# Patient Record
Sex: FEMALE | Race: WHITE | URBAN - METROPOLITAN AREA
[De-identification: names, ages, dates, MRNs, and addresses within clinical notes are randomized per-mention and may not be internally consistent; named-entity substitution may affect disease eponyms.]

---

## 2020-10-07 ENCOUNTER — TELEPHONE (OUTPATIENT)
Dept: ORTHOPEDIC SURGERY | Age: 60
End: 2020-10-07

## 2020-10-07 NOTE — TELEPHONE ENCOUNTER
Auth: # [id number]    Date: 10/26/20  Type of SX:  OUTPATIENT  Location: [facility] [address] [facility]  CPT: [address]  [address] area: [place]  Insurance: [employer]IS[employer]  SX VALID 10/26/20 TO 04/24/21

## 2020-10-19 ENCOUNTER — TELEPHONE (OUTPATIENT)
Dept: ORTHOPEDIC SURGERY | Age: 60
End: 2020-10-19

## 2020-10-27 ENCOUNTER — TELEPHONE (OUTPATIENT)
Dept: ORTHOPEDIC SURGERY | Age: 60
End: 2020-10-27

## 2020-10-27 NOTE — TELEPHONE ENCOUNTER
DR. YU POST OP CALL DAY 1:     POST OP:   __ CARLY  __ TKA _x_SCOPE     -How are you doing? Doing well     -Is pain controlled with oral analgesics? Manageable    If not, okay to increase to 1-2 tablets Q3 hours.     -Able to ambulate without difficulty? Yes- has been using crutches - minor weight bearing     -Is the dressing clean, dry and intact?    Yes

## 2020-10-28 ENCOUNTER — TELEPHONE (OUTPATIENT)
Dept: ORTHOPEDIC SURGERY | Age: 60
End: 2020-10-28

## 2020-10-28 RX ORDER — OXYCODONE HYDROCHLORIDE AND ACETAMINOPHEN 5; 325 MG/1; MG/1
1 TABLET ORAL EVERY 4 HOURS PRN
Qty: 12 TABLET | Refills: 0 | Status: SHIPPED | OUTPATIENT
Start: 2020-10-28 | End: 2020-10-31

## 2020-10-28 NOTE — TELEPHONE ENCOUNTER
PT states her pain has increased significantly in the past 24 hours. She is currently taking her pain medicine 2 tabs po q 4 hrs. She states that it was not touching her pain last night. She states she is slightly better today. She would like something else called in if possible. She is having trouble sleeping. Please advise.      Preferred pharmacy- [address]

## 2020-11-03 ENCOUNTER — OFFICE VISIT (OUTPATIENT)
Dept: ORTHOPEDIC SURGERY | Age: 60
End: 2020-11-03

## 2020-11-03 VITALS — WEIGHT: 170 LBS | HEIGHT: 66 IN | BODY MASS INDEX: 27.32 KG/M2

## 2020-11-03 PROCEDURE — 99024 POSTOP FOLLOW-UP VISIT: CPT | Performed by: ORTHOPAEDIC SURGERY

## 2020-11-03 RX ORDER — PREDNISONE 10 MG/1
TABLET ORAL
Qty: 23 TABLET | Refills: 0 | Status: SHIPPED | OUTPATIENT
Start: 2020-11-03

## 2020-11-03 RX ORDER — TRAMADOL HYDROCHLORIDE 50 MG/1
50 TABLET ORAL EVERY 6 HOURS PRN
Qty: 28 TABLET | Refills: 0 | Status: SHIPPED | OUTPATIENT
Start: 2020-11-03 | End: 2020-11-10

## 2020-11-03 NOTE — PROGRESS NOTES
Date of Encounter: 11/3/2020  Patient Donte Hartman    Chief Complaint   Patient presents with    Knee Pain     right       History of Present Illness:  Patient 1 week out from her right knee arthroscopy with medial meniscectomy. Little more swollen and a little more sore than she anticipated. Trying to get off the pain medicine. Still using 1 crutch. History reviewed. No pertinent past medical history. History reviewed. No pertinent surgical history. Current Outpatient Medications   Medication Sig Dispense Refill    traMADol (ULTRAM) 50 MG tablet Take 1 tablet by mouth every 6 hours as needed for Pain for up to 7 days. Intended supply: 7 days. Take lowest dose possible to manage pain 28 tablet 0    predniSONE (DELTASONE) 10 MG tablet Day 1: 6 tablets, day 2: 4 tablets, day 3:4 tablets, day 4: 3 tablets, day 5:3 tablets, day 6: 2 tablets, day 7: 1 tablet 23 tablet 0     No current facility-administered medications for this visit. allergies, social and family histories were reviewed and updated as appropriate. Review of Systems:  Relevant review of systems reviewed and available in the patient's chart and scanned in under the MEDIA tab on 11/4/2020. Vital Signs:  Ht 5' 6\" (1.676 m)   Wt 170 lb (77.1 kg)   BMI 27.44 kg/m²     General Exam:   Constitutional: She is adequately groomed with no evidence of malnutrition, obesity absent  Mental Status: She is oriented to time, place and person. Normal mentation and affect for age. Lymphatic: The lymphatic examination bilaterally reveals all areas to be without enlargement or induration. Vascular: Examination reveals no swelling or calf tenderness. Peripheral pulses are palpable and 2+. Neurological: She has good coordination and balance. There is no focal weakness or sensory deficit. Pertinent Exam:  Right knee is a moderately swollen with an effusion. Obviously limited flexion. Portals are healing nicely.     Xray Findings:  No new x-rays were obtained    Assessment & Plan:  Patient is certainly little more swollen than average from her arthroscopy. We will place her on a 1 week prednisone taper as well as get her set up in a little bit of therapy. Call her in some tramadol to take as well to get her off the narcotics. I will see her back in 4 weeks      Documentation was done using voice recognition dragon software. Every effort was made to ensure accuracy; however, inadvertent  Unintentional computerized transcription errors may be present.

## 2020-11-05 ENCOUNTER — TELEPHONE (OUTPATIENT)
Dept: ORTHOPEDIC SURGERY | Age: 60
End: 2020-11-05

## 2020-12-04 ENCOUNTER — OFFICE VISIT (OUTPATIENT)
Dept: ORTHOPEDIC SURGERY | Age: 60
End: 2020-12-04

## 2020-12-04 VITALS — HEIGHT: 66 IN | WEIGHT: 170 LBS | BODY MASS INDEX: 27.32 KG/M2

## 2020-12-04 PROCEDURE — 99024 POSTOP FOLLOW-UP VISIT: CPT | Performed by: ORTHOPAEDIC SURGERY
